# Patient Record
Sex: MALE | Race: WHITE | Employment: OTHER | URBAN - METROPOLITAN AREA
[De-identification: names, ages, dates, MRNs, and addresses within clinical notes are randomized per-mention and may not be internally consistent; named-entity substitution may affect disease eponyms.]

---

## 2017-01-31 DIAGNOSIS — R26.9 GAIT ABNORMALITY: Primary | ICD-10-CM

## 2017-01-31 DIAGNOSIS — G62.9 NEUROPATHY: ICD-10-CM

## 2017-01-31 RX ORDER — GABAPENTIN 300 MG/1
300 CAPSULE ORAL NIGHTLY
Qty: 30 CAPSULE | Refills: 3 | OUTPATIENT
Start: 2017-01-31

## 2017-01-31 NOTE — TELEPHONE ENCOUNTER
Medication: Gabapentin    Date of last refill: 3-1-16  [place]     Last office visit: 9-27-16  Due back to clinic per last office note:  6 mo  Date next office visit scheduled:  None scheduled    Last OV note recommendat studies to date, potential differential diagnosis, as well as therapeutic options; patient allowed to ask questions and all questions answered.    (G62.9) Neuropathy  (primary encounter diagnosis)  Plan: HEMOGLOBIN A1C, OP REFERRAL TO NEUROLOGY        As n

## 2017-02-01 NOTE — TELEPHONE ENCOUNTER
Spoke with Yadira Amezcua who states he did not have any problems with gabapentin but that he does have reactions with Cymbalta. He states that he feels like it's working well for him and would like to continue using the medication.

## 2017-02-02 RX ORDER — GABAPENTIN 300 MG/1
300 CAPSULE ORAL NIGHTLY
Qty: 30 CAPSULE | Refills: 3 | Status: SHIPPED | OUTPATIENT
Start: 2017-02-02 | End: 2018-10-02

## 2017-06-05 DIAGNOSIS — R26.9 GAIT ABNORMALITY: Primary | ICD-10-CM

## 2017-06-05 DIAGNOSIS — G62.9 NEUROPATHY: ICD-10-CM

## 2017-06-05 RX ORDER — GABAPENTIN 300 MG/1
300 CAPSULE ORAL NIGHTLY
Qty: 30 CAPSULE | Refills: 3 | Status: CANCELLED | OUTPATIENT
Start: 2017-06-05

## 2017-06-05 NOTE — TELEPHONE ENCOUNTER
Medication: Gabapentin 300 mg    Date of last refill: 2-2-17 with 3 addt refills  [place]     Last office visit: 9-27-16  Due back to clinic per last office note:  6 mo  Date next office visit scheduled:  None scheduled discussion of diagnostic studies to date, potential differential diagnosis, as well as therapeutic options; patient allowed to ask questions and all questions answered.    (G62.9) Neuropathy  (primary encounter diagnosis)  Plan: HEMOGLOBIN A1C, OP REFERRAL

## 2017-06-06 NOTE — TELEPHONE ENCOUNTER
Spoke to patient and he does not need a refill on gabapentin and when patient needs a refill he will schedule a follow up appt.  Verbalized understanding

## 2017-08-16 PROCEDURE — 88305 TISSUE EXAM BY PATHOLOGIST: CPT | Performed by: INTERNAL MEDICINE

## 2017-09-18 PROBLEM — K59.00 CONSTIPATION, UNSPECIFIED CONSTIPATION TYPE: Status: ACTIVE | Noted: 2017-09-18

## 2017-10-25 PROCEDURE — 86618 LYME DISEASE ANTIBODY: CPT | Performed by: EMERGENCY MEDICINE

## 2017-10-25 PROCEDURE — 36415 COLL VENOUS BLD VENIPUNCTURE: CPT | Performed by: EMERGENCY MEDICINE

## 2018-10-02 ENCOUNTER — OFFICE VISIT (OUTPATIENT)
Dept: NEUROLOGY | Facility: CLINIC | Age: 83
End: 2018-10-02
Payer: MEDICARE

## 2018-10-02 VITALS
RESPIRATION RATE: 16 BRPM | HEIGHT: 65 IN | HEART RATE: 74 BPM | BODY MASS INDEX: 22.49 KG/M2 | SYSTOLIC BLOOD PRESSURE: 134 MMHG | DIASTOLIC BLOOD PRESSURE: 70 MMHG | WEIGHT: 135 LBS

## 2018-10-02 DIAGNOSIS — R15.9 INCONTINENCE OF FECES, UNSPECIFIED FECAL INCONTINENCE TYPE: ICD-10-CM

## 2018-10-02 DIAGNOSIS — R29.898 RIGHT LEG WEAKNESS: ICD-10-CM

## 2018-10-02 DIAGNOSIS — R26.81 GAIT INSTABILITY: Primary | ICD-10-CM

## 2018-10-02 PROCEDURE — 99214 OFFICE O/P EST MOD 30 MIN: CPT | Performed by: OTHER

## 2018-10-02 RX ORDER — SACCHAROMYCES BOULARDII 250 MG
250 CAPSULE ORAL 2 TIMES DAILY
COMMUNITY

## 2018-10-02 RX ORDER — CALCIUM POLYCARBOPHIL 625 MG 625 MG/1
625 TABLET ORAL DAILY
COMMUNITY

## 2018-10-02 NOTE — PATIENT INSTRUCTIONS
Have MRI lumbar spine done at earliest convenience    Refill policies:    • Allow 2-3 business days for refills; controlled substances may take longer.   • Contact your pharmacy at least 5 days prior to running out of medication and have them send an electr authorization, patient may be responsible for the entire amount billed. Precertification and Prior Authorizations: If your physician has recommended that you have a procedure or additional testing performed.   [facility]) will

## 2018-10-02 NOTE — PROGRESS NOTES
[facility] Progress Note    HPI:   Patient presents with:  Neuropathy: Follow up and has burning of legs      As per my initial H&P from 9/16/14:  Bobby Funez is a 69year old, with history prostate cancer, who presents for evalu past; denies slurred speech, difficulty speaking or difficulty swallowing.    Otherwise, patient denies any recent weight change, fevers, chills, nausea, double vision/ blurry vision / loss of vision, chest pain, palpitations, shortness of breath, rashes, j diverticulosis  8/16/2017: COLONOSCOPY; N/A      Comment:  Procedure: COLONOSCOPY, POSSIBLE BIOPSY, POSSIBLE                POLYPECTOMY [id number];  Surgeon: Danny Rojas MD;                 Location: [address] [place] [address]  8/16/2017: COLONOSCOPY, POSSIB Surgeon: Lennice Felty, MD;  Location: [address] [place] [address] MANAGEMENT  5-25-10: OTHER SURGICAL HISTORY      Comment:  Cysto- Dr. Kat Fine  5/3/07: OTHER SURGICAL HISTORY      Comment:  RRP- Dr. Kat Fine  11/26/2012: PATIENT DOCUMENTED NOT TO JOINT INJECTION; N/A      Comment:  Performed by Carmelina Orta MD at [address] [place] [address],[address]  May  3, 2007: SPECIAL SERVICE OR REPORT      Comment:  Radical Prostatectomy  Family History   Problem Relation Age of Onset   • Cancer Fa Take 625 mg by mouth daily. , Disp: , Rfl:   •  saccharomyces boulardii (FLORASTOR) 250 MG Oral Cap, Take 250 mg by mouth 2 (two) times daily. , Disp: , Rfl:     Review of Systems:  No chest pain or palpitations; admits to being depressed; otherwise as noted g/dL 15.0     Hematocrit      39.0-51.0 % 44.1     MCV      79.0-99.0 fL 87.8     MCH      26.0-32.5 pg 29.9     MCHC      31.8-36.0 g/dL 34.0     Platelet Count      563-559 10:3/uL 247     RDW      11.2-14.4 % 12.9     MEAN PLATELET VOLUME      7.0-11.5 last visit:    Prior as noted below  FINDINGS:   There postoperative changes from laminectomies at L4-L5.  There is a small amount of enhancement within the laminectomy bed, surrounding the L4-L5 facet joints, and within the L4-5 dorsal epidural space which spinal canal stenosis. Moderate to severe bilateral foraminal narrowing. Bilateral subarticular zone narrowing. L5-S1: Mild diffuse disc bulge with moderate facet hypertrophy. Left subarticular zone narrowing. No spinal canal stenosis.  Mild to moderate moderate to severe   bilateral neural foraminal narrowing. At C5-C6, there are prominent bilateral foraminal disc osteophyte complexes and uncovertebral joint   hypertrophy. There is severe bilateral facet arthropathy.  Overall, there is mild central [place] Chronic neurogenic changes with increased amplitude, duration   and reduced recruitment, were noted in the in the left EHL,   medial gastroc, TA, peroneus longus, in the left lower extremity   and to a lesser extent in the distal muscles of the left upper muscles tested (except deltoid, biceps), with distal muscles more prominently affected. Impression: This is an abnormal study. There is electrophysiologic evidence consistent with a chronic, length dependendent, axonal sensory and motor polyneuropathy.

## 2018-10-03 ENCOUNTER — TELEPHONE (OUTPATIENT)
Dept: NEUROLOGY | Facility: CLINIC | Age: 83
End: 2018-10-03

## 2018-10-03 NOTE — TELEPHONE ENCOUNTER
Patient presents to office for neuropathy. Requesting to have disabilities placard form completed. Placed on physician desk for signature and review. Sent to scanning when completed.

## 2018-10-11 ENCOUNTER — TELEPHONE (OUTPATIENT)
Dept: NEUROLOGY | Facility: CLINIC | Age: 83
End: 2018-10-11

## 2018-10-17 ENCOUNTER — HOSPITAL ENCOUNTER (OUTPATIENT)
Dept: MRI IMAGING | Facility: HOSPITAL | Age: 83
Discharge: HOME | End: 2018-10-17
Attending: Other
Payer: MEDICARE

## 2018-10-17 DIAGNOSIS — R29.898 RIGHT LEG WEAKNESS: ICD-10-CM

## 2018-10-17 DIAGNOSIS — R26.81 GAIT INSTABILITY: ICD-10-CM

## 2018-10-17 DIAGNOSIS — R15.9 INCONTINENCE OF FECES, UNSPECIFIED FECAL INCONTINENCE TYPE: ICD-10-CM

## 2018-10-17 PROCEDURE — 72158 MRI LUMBAR SPINE W/O & W/DYE: CPT | Performed by: OTHER

## 2018-10-17 PROCEDURE — A9575 INJ GADOTERATE MEGLUMI 0.1ML: HCPCS | Performed by: OTHER

## 2018-10-17 PROCEDURE — 82565 ASSAY OF CREATININE: CPT

## 2018-10-20 ENCOUNTER — TELEPHONE (OUTPATIENT)
Dept: NEUROLOGY | Facility: CLINIC | Age: 83
End: 2018-10-20

## 2018-10-20 NOTE — TELEPHONE ENCOUNTER
Patient called requesting MRI L spine done on 10/17. Gave him the results, nothing worrisome. Multilevel DJD, right L5 nerve root compression. Call the office on Monday for further plan of care          1. Stable multilevel degenerative changes in the lumbar spine as above without significant spinal canal stenosis at any level. 2.  Stable mild left neural foraminal stenosis at L2-3. Stable mild right neural foraminal stenosis at L3-4. Stable moderate right and moderate to severe left neural foraminal stenosis at L4-5. Stable mild bilateral neural foraminal stenosis at L5-S1.     3.  Stable facet arthropathy throughout the lumbar spine, most pronounced at L5-S1.     4. There are stable postoperative changes from a previous laminectomy at L4. There is stable minimal enhancing scar tissue in the right lateral and right ventral epidural spaces at L4-5 that partially encases the traversing right L5 nerve root. Clinical correlation recommended.

## 2018-10-29 ENCOUNTER — TELEPHONE (OUTPATIENT)
Dept: NEUROLOGY | Facility: CLINIC | Age: 83
End: 2018-10-29

## 2018-11-19 NOTE — TELEPHONE ENCOUNTER
Do not think imaging findings causing irritable bowel symptoms - plan would be for PT and /or referral to pain management if amenable

## 2018-11-20 NOTE — TELEPHONE ENCOUNTER
Informed patient regarding Dr Sandor Bartholomew recommendations. Patient states that he had PT a  Few years ago and he has a rupture disc and wants to know if the PT is recommended. Would Dr Dom Ngo that patient makes an appt with a GI doctor.

## 2018-11-23 NOTE — TELEPHONE ENCOUNTER
Patient informed of below.  Patient states he will call back next week to let us know where he wants to do PT.

## 2021-07-01 PROBLEM — R15.9 INCONTINENCE OF FECES: Status: ACTIVE | Noted: 2017-11-05

## 2021-07-01 PROBLEM — R15.2 FECAL URGENCY: Status: ACTIVE | Noted: 2017-11-05

## 2021-07-01 PROBLEM — K63.8219 SMALL INTESTINAL BACTERIAL OVERGROWTH: Status: ACTIVE | Noted: 2019-11-27

## 2021-07-01 PROBLEM — M54.50 LOW BACK PAIN: Status: ACTIVE | Noted: 2017-11-05

## 2021-07-01 PROBLEM — R19.8 ALTERNATING CONSTIPATION AND DIARRHEA: Status: ACTIVE | Noted: 2017-11-05

## 2021-07-01 PROBLEM — R35.0 URINARY FREQUENCY: Status: ACTIVE | Noted: 2017-11-05

## 2021-07-01 PROBLEM — R41.3 MEMORY CHANGES: Status: ACTIVE | Noted: 2017-11-05

## 2021-07-01 PROBLEM — K58.9 IRRITABLE BOWEL SYNDROME (IBS): Status: ACTIVE | Noted: 2019-04-04

## 2021-07-01 PROBLEM — Z86.010 HISTORY OF COLON POLYPS: Status: ACTIVE | Noted: 2020-01-31

## 2021-07-01 PROBLEM — K63.89 SMALL INTESTINAL BACTERIAL OVERGROWTH: Status: ACTIVE | Noted: 2019-11-27
